# Patient Record
Sex: FEMALE | Race: OTHER | HISPANIC OR LATINO
[De-identification: names, ages, dates, MRNs, and addresses within clinical notes are randomized per-mention and may not be internally consistent; named-entity substitution may affect disease eponyms.]

---

## 2023-04-27 PROBLEM — Z00.00 ENCOUNTER FOR PREVENTIVE HEALTH EXAMINATION: Status: ACTIVE | Noted: 2023-04-27

## 2023-05-03 ENCOUNTER — ASOB RESULT (OUTPATIENT)
Age: 23
End: 2023-05-03

## 2023-05-03 ENCOUNTER — APPOINTMENT (OUTPATIENT)
Dept: ANTEPARTUM | Facility: CLINIC | Age: 23
End: 2023-05-03
Payer: MEDICAID

## 2023-05-03 PROCEDURE — 76817 TRANSVAGINAL US OBSTETRIC: CPT

## 2023-05-03 PROCEDURE — 76811 OB US DETAILED SNGL FETUS: CPT

## 2023-06-28 ENCOUNTER — ASOB RESULT (OUTPATIENT)
Age: 23
End: 2023-06-28

## 2023-06-28 ENCOUNTER — APPOINTMENT (OUTPATIENT)
Dept: ANTEPARTUM | Facility: CLINIC | Age: 23
End: 2023-06-28
Payer: MEDICAID

## 2023-06-28 PROCEDURE — 76816 OB US FOLLOW-UP PER FETUS: CPT

## 2023-06-28 PROCEDURE — 76819 FETAL BIOPHYS PROFIL W/O NST: CPT

## 2023-07-19 ENCOUNTER — APPOINTMENT (OUTPATIENT)
Dept: ANTEPARTUM | Facility: CLINIC | Age: 23
End: 2023-07-19

## 2023-07-24 ENCOUNTER — ASOB RESULT (OUTPATIENT)
Age: 23
End: 2023-07-24

## 2023-07-24 ENCOUNTER — APPOINTMENT (OUTPATIENT)
Dept: ANTEPARTUM | Facility: CLINIC | Age: 23
End: 2023-07-24
Payer: MEDICAID

## 2023-07-24 PROCEDURE — 76818 FETAL BIOPHYS PROFILE W/NST: CPT

## 2023-07-24 PROCEDURE — 76820 UMBILICAL ARTERY ECHO: CPT | Mod: 59

## 2023-07-24 PROCEDURE — 76816 OB US FOLLOW-UP PER FETUS: CPT

## 2023-08-28 ENCOUNTER — APPOINTMENT (OUTPATIENT)
Dept: ANTEPARTUM | Facility: CLINIC | Age: 23
End: 2023-08-28
Payer: MEDICAID

## 2023-08-28 ENCOUNTER — ASOB RESULT (OUTPATIENT)
Age: 23
End: 2023-08-28

## 2023-08-28 PROCEDURE — 76816 OB US FOLLOW-UP PER FETUS: CPT

## 2023-09-04 ENCOUNTER — INPATIENT (INPATIENT)
Facility: HOSPITAL | Age: 23
LOS: 1 days | Discharge: HOME | End: 2023-09-06
Attending: SPECIALIST | Admitting: SPECIALIST
Payer: COMMERCIAL

## 2023-09-04 VITALS
TEMPERATURE: 98 F | HEART RATE: 89 BPM | SYSTOLIC BLOOD PRESSURE: 113 MMHG | RESPIRATION RATE: 18 BRPM | DIASTOLIC BLOOD PRESSURE: 76 MMHG

## 2023-09-04 DIAGNOSIS — O47.1 FALSE LABOR AT OR AFTER 37 COMPLETED WEEKS OF GESTATION: ICD-10-CM

## 2023-09-04 DIAGNOSIS — O26.899 OTHER SPECIFIED PREGNANCY RELATED CONDITIONS, UNSPECIFIED TRIMESTER: ICD-10-CM

## 2023-09-04 LAB
ABO RH CONFIRMATION: SIGNIFICANT CHANGE UP
BASOPHILS # BLD AUTO: 0.01 K/UL — SIGNIFICANT CHANGE UP (ref 0–0.2)
BASOPHILS NFR BLD AUTO: 0.1 % — SIGNIFICANT CHANGE UP (ref 0–2)
BLD GP AB SCN SERPL QL: SIGNIFICANT CHANGE UP
EOSINOPHIL # BLD AUTO: 0.01 K/UL — SIGNIFICANT CHANGE UP (ref 0–0.5)
EOSINOPHIL NFR BLD AUTO: 0.1 % — SIGNIFICANT CHANGE UP (ref 0–6)
HCT VFR BLD CALC: 35.5 % — SIGNIFICANT CHANGE UP (ref 34.5–45)
HGB BLD-MCNC: 12.3 G/DL — SIGNIFICANT CHANGE UP (ref 11.5–15.5)
IMM GRANULOCYTES NFR BLD AUTO: 0.8 % — SIGNIFICANT CHANGE UP (ref 0–0.9)
LYMPHOCYTES # BLD AUTO: 1.27 K/UL — SIGNIFICANT CHANGE UP (ref 1–3.3)
LYMPHOCYTES # BLD AUTO: 11.3 % — LOW (ref 13–44)
MCHC RBC-ENTMCNC: 28.1 PG — SIGNIFICANT CHANGE UP (ref 27–34)
MCHC RBC-ENTMCNC: 34.6 GM/DL — SIGNIFICANT CHANGE UP (ref 32–36)
MCV RBC AUTO: 81.1 FL — SIGNIFICANT CHANGE UP (ref 80–100)
MONOCYTES # BLD AUTO: 0.25 K/UL — SIGNIFICANT CHANGE UP (ref 0–0.9)
MONOCYTES NFR BLD AUTO: 2.2 % — SIGNIFICANT CHANGE UP (ref 2–14)
NEUTROPHILS # BLD AUTO: 9.61 K/UL — HIGH (ref 1.8–7.4)
NEUTROPHILS NFR BLD AUTO: 85.5 % — HIGH (ref 43–77)
PLATELET # BLD AUTO: 294 K/UL — SIGNIFICANT CHANGE UP (ref 150–400)
RBC # BLD: 4.38 M/UL — SIGNIFICANT CHANGE UP (ref 3.8–5.2)
RBC # FLD: 16.7 % — HIGH (ref 10.3–14.5)
T PALLIDUM AB TITR SER: NEGATIVE — SIGNIFICANT CHANGE UP
WBC # BLD: 11.24 K/UL — HIGH (ref 3.8–10.5)
WBC # FLD AUTO: 11.24 K/UL — HIGH (ref 3.8–10.5)

## 2023-09-04 PROCEDURE — 59409 OBSTETRICAL CARE: CPT | Mod: U9

## 2023-09-04 RX ORDER — TETANUS TOXOID, REDUCED DIPHTHERIA TOXOID AND ACELLULAR PERTUSSIS VACCINE, ADSORBED 5; 2.5; 8; 8; 2.5 [IU]/.5ML; [IU]/.5ML; UG/.5ML; UG/.5ML; UG/.5ML
0.5 SUSPENSION INTRAMUSCULAR ONCE
Refills: 0 | Status: DISCONTINUED | OUTPATIENT
Start: 2023-09-04 | End: 2023-09-06

## 2023-09-04 RX ORDER — DIPHENHYDRAMINE HCL 50 MG
25 CAPSULE ORAL EVERY 6 HOURS
Refills: 0 | Status: DISCONTINUED | OUTPATIENT
Start: 2023-09-04 | End: 2023-09-06

## 2023-09-04 RX ORDER — MORPHINE SULFATE 50 MG/1
4 CAPSULE, EXTENDED RELEASE ORAL ONCE
Refills: 0 | Status: DISCONTINUED | OUTPATIENT
Start: 2023-09-04 | End: 2023-09-04

## 2023-09-04 RX ORDER — HYDROCORTISONE 1 %
1 OINTMENT (GRAM) TOPICAL EVERY 6 HOURS
Refills: 0 | Status: DISCONTINUED | OUTPATIENT
Start: 2023-09-04 | End: 2023-09-06

## 2023-09-04 RX ORDER — SODIUM CHLORIDE 9 MG/ML
3 INJECTION INTRAMUSCULAR; INTRAVENOUS; SUBCUTANEOUS EVERY 8 HOURS
Refills: 0 | Status: DISCONTINUED | OUTPATIENT
Start: 2023-09-04 | End: 2023-09-06

## 2023-09-04 RX ORDER — OXYCODONE HYDROCHLORIDE 5 MG/1
5 TABLET ORAL
Refills: 0 | Status: DISCONTINUED | OUTPATIENT
Start: 2023-09-04 | End: 2023-09-06

## 2023-09-04 RX ORDER — PRAMOXINE HYDROCHLORIDE 150 MG/15G
1 AEROSOL, FOAM RECTAL EVERY 4 HOURS
Refills: 0 | Status: DISCONTINUED | OUTPATIENT
Start: 2023-09-04 | End: 2023-09-06

## 2023-09-04 RX ORDER — SIMETHICONE 80 MG/1
80 TABLET, CHEWABLE ORAL EVERY 4 HOURS
Refills: 0 | Status: DISCONTINUED | OUTPATIENT
Start: 2023-09-04 | End: 2023-09-06

## 2023-09-04 RX ORDER — IBUPROFEN 200 MG
600 TABLET ORAL EVERY 6 HOURS
Refills: 0 | Status: COMPLETED | OUTPATIENT
Start: 2023-09-04 | End: 2024-08-02

## 2023-09-04 RX ORDER — DIBUCAINE 1 %
1 OINTMENT (GRAM) RECTAL EVERY 6 HOURS
Refills: 0 | Status: DISCONTINUED | OUTPATIENT
Start: 2023-09-04 | End: 2023-09-06

## 2023-09-04 RX ORDER — IBUPROFEN 200 MG
600 TABLET ORAL EVERY 6 HOURS
Refills: 0 | Status: DISCONTINUED | OUTPATIENT
Start: 2023-09-04 | End: 2023-09-06

## 2023-09-04 RX ORDER — OXYTOCIN 10 UNIT/ML
41.67 VIAL (ML) INJECTION
Qty: 20 | Refills: 0 | Status: DISCONTINUED | OUTPATIENT
Start: 2023-09-04 | End: 2023-09-06

## 2023-09-04 RX ORDER — ACETAMINOPHEN 500 MG
975 TABLET ORAL
Refills: 0 | Status: DISCONTINUED | OUTPATIENT
Start: 2023-09-04 | End: 2023-09-06

## 2023-09-04 RX ORDER — BENZOCAINE 10 %
1 GEL (GRAM) MUCOUS MEMBRANE EVERY 6 HOURS
Refills: 0 | Status: DISCONTINUED | OUTPATIENT
Start: 2023-09-04 | End: 2023-09-06

## 2023-09-04 RX ORDER — LANOLIN
1 OINTMENT (GRAM) TOPICAL EVERY 6 HOURS
Refills: 0 | Status: DISCONTINUED | OUTPATIENT
Start: 2023-09-04 | End: 2023-09-06

## 2023-09-04 RX ORDER — CITRIC ACID/SODIUM CITRATE 300-500 MG
30 SOLUTION, ORAL ORAL ONCE
Refills: 0 | Status: DISCONTINUED | OUTPATIENT
Start: 2023-09-04 | End: 2023-09-04

## 2023-09-04 RX ORDER — MAGNESIUM HYDROXIDE 400 MG/1
30 TABLET, CHEWABLE ORAL
Refills: 0 | Status: DISCONTINUED | OUTPATIENT
Start: 2023-09-04 | End: 2023-09-06

## 2023-09-04 RX ORDER — OXYCODONE HYDROCHLORIDE 5 MG/1
5 TABLET ORAL ONCE
Refills: 0 | Status: DISCONTINUED | OUTPATIENT
Start: 2023-09-04 | End: 2023-09-06

## 2023-09-04 RX ORDER — OXYTOCIN 10 UNIT/ML
333.33 VIAL (ML) INJECTION
Qty: 20 | Refills: 0 | Status: DISCONTINUED | OUTPATIENT
Start: 2023-09-04 | End: 2023-09-06

## 2023-09-04 RX ORDER — CHLORHEXIDINE GLUCONATE 213 G/1000ML
1 SOLUTION TOPICAL DAILY
Refills: 0 | Status: DISCONTINUED | OUTPATIENT
Start: 2023-09-04 | End: 2023-09-04

## 2023-09-04 RX ORDER — ONDANSETRON 8 MG/1
4 TABLET, FILM COATED ORAL ONCE
Refills: 0 | Status: COMPLETED | OUTPATIENT
Start: 2023-09-04 | End: 2023-09-04

## 2023-09-04 RX ORDER — SODIUM CHLORIDE 9 MG/ML
1000 INJECTION, SOLUTION INTRAVENOUS
Refills: 0 | Status: DISCONTINUED | OUTPATIENT
Start: 2023-09-04 | End: 2023-09-04

## 2023-09-04 RX ORDER — AER TRAVELER 0.5 G/1
1 SOLUTION RECTAL; TOPICAL EVERY 4 HOURS
Refills: 0 | Status: DISCONTINUED | OUTPATIENT
Start: 2023-09-04 | End: 2023-09-06

## 2023-09-04 RX ORDER — KETOROLAC TROMETHAMINE 30 MG/ML
30 SYRINGE (ML) INJECTION ONCE
Refills: 0 | Status: DISCONTINUED | OUTPATIENT
Start: 2023-09-04 | End: 2023-09-04

## 2023-09-04 RX ADMIN — Medication 600 MILLIGRAM(S): at 22:53

## 2023-09-04 RX ADMIN — Medication 975 MILLIGRAM(S): at 20:15

## 2023-09-04 RX ADMIN — SODIUM CHLORIDE 125 MILLILITER(S): 9 INJECTION, SOLUTION INTRAVENOUS at 11:55

## 2023-09-04 RX ADMIN — Medication 1000 MILLIUNIT(S)/MIN: at 16:01

## 2023-09-04 RX ADMIN — MORPHINE SULFATE 4 MILLIGRAM(S): 50 CAPSULE, EXTENDED RELEASE ORAL at 16:30

## 2023-09-04 RX ADMIN — SODIUM CHLORIDE 3 MILLILITER(S): 9 INJECTION INTRAMUSCULAR; INTRAVENOUS; SUBCUTANEOUS at 21:09

## 2023-09-04 RX ADMIN — Medication 975 MILLIGRAM(S): at 21:00

## 2023-09-04 RX ADMIN — ONDANSETRON 4 MILLIGRAM(S): 8 TABLET, FILM COATED ORAL at 12:16

## 2023-09-04 RX ADMIN — MORPHINE SULFATE 4 MILLIGRAM(S): 50 CAPSULE, EXTENDED RELEASE ORAL at 17:14

## 2023-09-04 NOTE — OB PROVIDER H&P - NSHPPHYSICALEXAM_GEN_ALL_CORE
Sono: Vtx confirmed on BSUS  EFW: 8/28 @ 38w, EFW 2633g, 7%    T(C): 36.7 (09-04-23 @ 12:48), Max: 36.8 (09-04-23 @ 11:18)  HR: 60 (09-04-23 @ 14:07) (60 - 89)  BP: 123/72 (09-04-23 @ 14:07) (113/76 - 123/72)  RR: 18 (09-04-23 @ 12:37) (18 - 18)  SpO2: --  Gen: NAD, appears uncomfortable during contractions  Abd: Gravid  Ext: non-tender, non-edematous  SVE:  7/90/-1 on most recent exam  Bedside sono: Vertex confirmed  FHT: Category I tracing  [place]: Ronak q4min                          12.3   11.24 )-----------( 294      ( 04 Sep 2023 12:00 )             35.5 Sono: Vtx confirmed on BSUS  EFW: 8/28 @ 38w, EFW 2633g, 7%    T(C): 36.7 (09-04-23 @ 12:48), Max: 36.8 (09-04-23 @ 11:18)  HR: 60 (09-04-23 @ 14:07) (60 - 89)  BP: 123/72 (09-04-23 @ 14:07) (113/76 - 123/72)  RR: 18 (09-04-23 @ 12:37) (18 - 18)    Gen: NAD, appears uncomfortable during contractions  Abd: Gravid  Ext: non-tender, non-edematous  SVE:  7/90/-1 on most recent exam  Bedside sono: Vertex confirmed  FHT: Category I tracing  [place]: Ronak q4min                          12.3   11.24 )-----------( 294      ( 04 Sep 2023 12:00 )             35.5

## 2023-09-04 NOTE — OB PROVIDER H&P - RUBELLA: DATE, OB PROFILE
Pt returned to unit via stretcher. VSS. Pt remains in Afib, MD aware. No complaints offered at this time.    24-Apr-2023

## 2023-09-04 NOTE — OB PROVIDER DELIVERY SUMMARY - NSPROVIDERDELIVERYNOTE_OBGYN_ALL_OB_FT
Patient noted to be fully dilated, and after a period of pushing, patient was prepped and draped for delivery. In conjunction with maternal effort, she delivered a viable female infant. Head delivered MYRNA compounded with right arm, no nuchal cord. After 30 sec, delayed cord clamping was performed then  was assessed by the bedside neonatologist, then provided to parent for skin-to-skin. Cord blood collected for blood gas. Placenta delivered spontaneously and intact, no gross pathology, 3 vessel cord appreciated. Perineum and vagina were inspected and a 2nd degree perineal laceration was noted and repaired with 0 vicryl. Excellent hemostasis obtained.    EBL 57cc    Sex: F, Delivery Time: 1558, [place] Weight: 2710g, APGARs: 9/9

## 2023-09-04 NOTE — OB PROVIDER H&P - ASSESSMENT
A/P:   23y  at 39w0d, presents to L&D in term labor.  -Admitted to L&D  -Consented  -Admission labs  -NPO, except ice chips   -IV fluids  -Labor: Intact. Latent labor. Ronak q4min  -Fetus: Cat I tracing. Continuous toco and fetal monitoring.   -GBS: Negative  -Analgesia: Declines epidural at this time  -DVT ppx: Ambulate and SCD's while in bed     Discussed with Dr. Mckinney   A/P:   23y  at 39w0d, presents to L&D in term labor.  -Admitted to L&D  -Consented  -Admission labs  -NPO, except ice chips   -IV fluids  -Labor: Intact. Latent labor. Ronak q4min  -Fetus: Cat I tracing. Continuous toco and fetal monitoring.   -GBS: Negative  -Analgesia: Declines epidural at this time  -DVT ppx: Ambulate and SCD's while in bed     Discussed with

## 2023-09-04 NOTE — OB RN DELIVERY SUMMARY - NSSELHIDDEN_OBGYN_ALL_OB_FT
[NS_DeliveryAttending1_OBGYN_ALL_OB_FT:[id number]],[NS_DeliveryAssist1_OBGYN_ALL_OB_FT:[email or web address]=],[NS_DeliveryRN_OBGYN_ALL_OB_FT:[email or web address]=]

## 2023-09-04 NOTE — OB RN DELIVERY SUMMARY - NS_SEPSISRSKCALC_OBGYN_ALL_OB_FT
EOS calculated successfully. EOS Risk Factor: 0.5/1000 live births ([facility][note 3] national incidence); GA=39w;Temp=98.24; ROM=1.833; GBS='Negative'; Antibiotics='No antibiotics or any antibiotics < 2 hrs prior to birth'

## 2023-09-04 NOTE — OB PROVIDER H&P - ATTENDING COMMENTS
23y  at 39w0d, presents to L&D in term labor.  - vertex   - GBS neg  - does not desire analgesia   - will augement as clinically indicated

## 2023-09-04 NOTE — OB PROVIDER H&P - HISTORY OF PRESENT ILLNESS
23y  at 39w0d by LMP who presents to L&D in term labor.  RAFFAELE: 23  LMP: 22  Prenatal course is significant for: Late to care, FGR (AC 5%, EFW 7%)    Reports painful contractions that started around 0100 today. They have been severely uncomfortable for the past 3 hours. Since 0200 she noted intermittent vaginal spotting only when she wiped. She has been vomiting since 0700 and this has improved with IV zofran. Denies leakage of fluid or decreased fetal movement.    POB: No previous pregnancies   PGYN: -fibroids, -ovarian cysts, denies STD hx, denies abnormal PAPs   PMH: Denies  PSH: Abdominal hernia repair in childhood repair  SH: Denies EtOH, tobacco and illicit drug use during this pregnancy; feels safe at home   Meds: PNVs (with Fe included)  Allergies: NKDA 23y  at 39w0d by LMP who presents to L&D in term labor.  RAFFAELE: 23  LMP: 22  Patient [facility] with [facility]  Prenatal course is significant for: Late to care, FGR (AC 5%, EFW 7%)    Reports painful contractions that started around 0100 today. They have been severely uncomfortable for the past 3 hours. Since 0200 she noted intermittent vaginal spotting only when she wiped. She has been vomiting since 0700 and this has improved with IV zofran. Denies leakage of fluid or decreased fetal movement.    POB: No previous pregnancies   PGYN: -fibroids, -ovarian cysts, denies STD hx, denies abnormal PAPs   PMH: Denies  PSH: Abdominal hernia repair in childhood repair  SH: Denies EtOH, tobacco and illicit drug use during this pregnancy; feels safe at home   Meds: PNVs (with Fe included)  Allergies: NKDA

## 2023-09-04 NOTE — OB PROVIDER DELIVERY SUMMARY - NSSELHIDDEN_OBGYN_ALL_OB_FT
[NS_DeliveryAttending1_OBGYN_ALL_OB_FT:[id number]=],[NS_DeliveryAssist1_OBGYN_ALL_OB_FT:[email or web address]=],[NS_DeliveryRN_OBGYN_ALL_OB_FT:[email or web address]=]

## 2023-09-04 NOTE — OB PROVIDER LABOR PROGRESS NOTE - ASSESSMENT
discontinuous tracoing  progressing in labor  will begin to push    tried to call Dr Mckinney  disscussed with Dr Ochoa

## 2023-09-04 NOTE — OB NEONATOLOGY/PEDIATRICIAN DELIVERY SUMMARY - NSPEDSNEONOTESA_OBGYN_ALL_OB_FT
Requested by DR Ochoa to attend a  of a 24y/o  at 39 weeks GA secondary to meconium stained amniotic fluids.  She had + PNC, is blood type B pos, HIV neg, HBsAg neg, RPR NR, Rubella Imm, GBS neg  L&D:  ROM with light meconium stained amniotic fluids.  Baby born vertex with good cry, DCC, , orally suctioned, dried, transferred to warmer, and examined. Infant showed to Aunt and then to be transferred to mother for STS.  A/P:  FT female, meconium stained amniotic fluid  Transition to Regular Nursery under Peds Hospitalist care.

## 2023-09-05 LAB
HCT VFR BLD CALC: 30.5 % — LOW (ref 34.5–45)
HGB BLD-MCNC: 10 G/DL — LOW (ref 11.5–15.5)

## 2023-09-05 RX ADMIN — Medication 975 MILLIGRAM(S): at 15:28

## 2023-09-05 RX ADMIN — Medication 975 MILLIGRAM(S): at 03:58

## 2023-09-05 RX ADMIN — Medication 1 TABLET(S): at 12:37

## 2023-09-05 RX ADMIN — SODIUM CHLORIDE 3 MILLILITER(S): 9 INJECTION INTRAMUSCULAR; INTRAVENOUS; SUBCUTANEOUS at 06:00

## 2023-09-05 RX ADMIN — Medication 975 MILLIGRAM(S): at 21:00

## 2023-09-05 RX ADMIN — Medication 975 MILLIGRAM(S): at 04:01

## 2023-09-05 RX ADMIN — Medication 600 MILLIGRAM(S): at 07:00

## 2023-09-05 RX ADMIN — Medication 600 MILLIGRAM(S): at 06:42

## 2023-09-05 RX ADMIN — Medication 975 MILLIGRAM(S): at 16:08

## 2023-09-05 RX ADMIN — SODIUM CHLORIDE 3 MILLILITER(S): 9 INJECTION INTRAMUSCULAR; INTRAVENOUS; SUBCUTANEOUS at 14:00

## 2023-09-05 RX ADMIN — Medication 600 MILLIGRAM(S): at 12:45

## 2023-09-05 RX ADMIN — Medication 600 MILLIGRAM(S): at 12:40

## 2023-09-05 RX ADMIN — Medication 600 MILLIGRAM(S): at 00:00

## 2023-09-05 RX ADMIN — Medication 975 MILLIGRAM(S): at 20:50

## 2023-09-05 NOTE — PROGRESS NOTE ADULT - SUBJECTIVE AND OBJECTIVE BOX
DIONY SIN is a 23y  now PPD#1 s/p spontaneous vaginal delivery at 39w0d gestation, c/b 2nd degree lac.    S:    The patient has no complaints.  Pain controlled with current treatment regimen.   She is ambulating without difficulty and tolerating PO.   + flatus/-BM/+ voiding   She endorses appropriate lochia, which is decreasing.      O:    T(C): 36.8 (23 @ 19:50), Max: 36.9 (23 @ 16:50)  HR: 77 (23 @ 19:50) (58 - 122)  BP: 96/55 (23 @ 19:50) (96/55 - 123/72)  RR: 17 (23 @ 19:50) (17 - 18)  SpO2: 99% (23 @ 19:50) (78% - 100%)    Gen: NAD, AOx3  Pulm: Breathing comfortably on RA  Abdomen:  Soft, non-tender, non-distended  Uterus:  Fundus firm below umbilicus  VE:  +Lochia  Ext:  Non-tender and non-edematous    LABS                        12.3   11.24 )-----------( 294      ( 04 Sep 2023 12:00 )             35.5

## 2023-09-06 ENCOUNTER — TRANSCRIPTION ENCOUNTER (OUTPATIENT)
Age: 23
End: 2023-09-06

## 2023-09-06 VITALS
RESPIRATION RATE: 16 BRPM | DIASTOLIC BLOOD PRESSURE: 58 MMHG | HEART RATE: 78 BPM | OXYGEN SATURATION: 99 % | SYSTOLIC BLOOD PRESSURE: 93 MMHG | TEMPERATURE: 98 F

## 2023-09-06 PROCEDURE — 86850 RBC ANTIBODY SCREEN: CPT

## 2023-09-06 PROCEDURE — 86780 TREPONEMA PALLIDUM: CPT

## 2023-09-06 PROCEDURE — 86900 BLOOD TYPING SEROLOGIC ABO: CPT

## 2023-09-06 PROCEDURE — 36415 COLL VENOUS BLD VENIPUNCTURE: CPT

## 2023-09-06 PROCEDURE — 85025 COMPLETE CBC W/AUTO DIFF WBC: CPT

## 2023-09-06 PROCEDURE — 86901 BLOOD TYPING SEROLOGIC RH(D): CPT

## 2023-09-06 PROCEDURE — 85014 HEMATOCRIT: CPT

## 2023-09-06 PROCEDURE — 85018 HEMOGLOBIN: CPT

## 2023-09-06 RX ORDER — IBUPROFEN 200 MG
1 TABLET ORAL
Qty: 20 | Refills: 0
Start: 2023-09-06 | End: 2023-09-10

## 2023-09-06 RX ORDER — ACETAMINOPHEN 500 MG
3 TABLET ORAL
Qty: 60 | Refills: 0
Start: 2023-09-06 | End: 2023-09-10

## 2023-09-06 RX ADMIN — Medication 975 MILLIGRAM(S): at 08:36

## 2023-09-06 RX ADMIN — Medication 975 MILLIGRAM(S): at 09:00

## 2023-09-06 RX ADMIN — Medication 600 MILLIGRAM(S): at 12:30

## 2023-09-06 RX ADMIN — Medication 600 MILLIGRAM(S): at 00:33

## 2023-09-06 RX ADMIN — Medication 600 MILLIGRAM(S): at 01:42

## 2023-09-06 RX ADMIN — Medication 975 MILLIGRAM(S): at 03:04

## 2023-09-06 RX ADMIN — Medication 1 TABLET(S): at 11:44

## 2023-09-06 RX ADMIN — Medication 600 MILLIGRAM(S): at 07:33

## 2023-09-06 RX ADMIN — Medication 600 MILLIGRAM(S): at 06:30

## 2023-09-06 RX ADMIN — Medication 600 MILLIGRAM(S): at 11:44

## 2023-09-06 NOTE — PROGRESS NOTE ADULT - ASSESSMENT
A/P:  23y  now PPD#1 s/p spontaneous vaginal delivery at 39w0d gestation, c/b 2nd degree lac.  -Vital signs stable  -Hgb: 12.3 -> AM labs pending   -Voiding, tolerating PO, bowel function nml   -Advance care as tolerated   -Continue routine postpartum care and education  -Healthy female infant  -Dispo: Continue inpatient management  
A/P:  23y  now PPD#2 s/p spontaneous vaginal delivery at 39w0d gestation, c/b 2nd degree lac.  -Vital signs stable  -Hgb: 12.3 ->10.0  -Voiding, tolerating PO, bowel function nml   -Advance care as tolerated   -Continue routine postpartum care and education  -Healthy female infant  -Dispo: Pt is stable for discharge home pending attending approval

## 2023-09-06 NOTE — DISCHARGE NOTE OB - CARE PLAN
Principal Discharge DX:	Spontaneous vaginal delivery  Assessment and plan of treatment:	Please call your provider in 1 week. Take medications as directed, regular diet, activity as tolerated. Exclusive breast feeding for the first 6 months is recommended. Nothing per vagina for 6 weeks (incl. sex, douching, etc). If you have additional concerns, please inform your provider.

## 2023-09-06 NOTE — DISCHARGE NOTE OB - CARE PROVIDER_API CALL
Pia Mckinney  Obstetrics and Gynecology  [address], [address]  [place] [address]  Phone: [phone]  Fax: [phone]  Follow Up Time:

## 2023-09-06 NOTE — DISCHARGE NOTE OB - PATIENT PORTAL LINK FT
You can access the FollowMyHealth Patient Portal offered by [facility] by registering at the following website: [email or web address]. By joining "[email or web address]"’s FollowMyHealth portal, you will also be able to view your health information using other applications (apps) compatible with our system.

## 2023-09-06 NOTE — PROGRESS NOTE ADULT - SUBJECTIVE AND OBJECTIVE BOX
DIONY SIN is a 23y  now PPD#2 s/p spontaneous vaginal delivery at 39w0d gestation, c/b 2nd degree lac.    S:    The patient has no complaints.  Pain controlled with current treatment regimen.   She is ambulating without difficulty and tolerating PO.   + flatus/-BM/+ voiding   She endorses appropriate lochia, which is decreasing.      O:    T(F): 98.7 (05 Sep 2023 20:30), Max: 98.7 (05 Sep 2023 20:30)  HR: 71 (05 Sep 2023 20:30) (66 - 71)  BP: 112/72 (05 Sep 2023 20:30) (100/63 - 112/72)  RR: 17 (05 Sep 2023 20:30) (15 - 17)  SpO2: 98% (05 Sep 2023 20:30) (98% - 99%)      Gen: NAD, AOx3  Pulm: Breathing comfortably on RA  Abdomen:  Soft, non-tender, non-distended  Uterus:  Fundus firm below umbilicus  VE:  +Lochia  Ext:  Non-tender and non-edematous    LABS                                       10.0   x     )-----------( x        ( 05 Sep 2023 07:16 )             30.5            DIONY SIN is a 23y  now PPD#2 s/p spontaneous vaginal delivery at 39w0d gestation, c/b 2nd degree lac.    S:    The patient has no complaints.  Pain controlled with current treatment regimen.   She is ambulating without difficulty and tolerating PO.   + flatus/-BM/+ voiding   She endorses appropriate lochia, which is decreasing.      O:    T(F): 98.7 (05 Sep 2023 20:30), Max: 98.7 (05 Sep 2023 20:30)  HR: 71 (05 Sep 2023 20:30) (71 - 71)  BP: 112/72 (05 Sep 2023 20:30) (112/72 - 112/72)  RR: 17 (05 Sep 2023 20:30) (17 - 17)  SpO2: 98% (05 Sep 2023 20:30) (98% - 98%)            Gen: NAD, AOx3  Pulm: Breathing comfortably on RA  Abdomen:  Soft, non-tender, non-distended  Uterus:  Fundus firm below umbilicus  VE:  +Lochia  Ext:  Non-tender and non-edematous    LABS                                       10.0   x     )-----------( x        ( 05 Sep 2023 07:16 )             30.5